# Patient Record
Sex: MALE | Race: ASIAN | NOT HISPANIC OR LATINO
[De-identification: names, ages, dates, MRNs, and addresses within clinical notes are randomized per-mention and may not be internally consistent; named-entity substitution may affect disease eponyms.]

---

## 2022-06-22 PROBLEM — Z00.00 ENCOUNTER FOR PREVENTIVE HEALTH EXAMINATION: Status: ACTIVE | Noted: 2022-06-22

## 2022-06-23 ENCOUNTER — APPOINTMENT (OUTPATIENT)
Dept: OTOLARYNGOLOGY | Facility: CLINIC | Age: 27
End: 2022-06-23
Payer: COMMERCIAL

## 2022-06-23 ENCOUNTER — NON-APPOINTMENT (OUTPATIENT)
Age: 27
End: 2022-06-23

## 2022-06-23 VITALS — WEIGHT: 140 LBS | BODY MASS INDEX: 21.22 KG/M2 | HEIGHT: 68 IN

## 2022-06-23 VITALS — TEMPERATURE: 97.6 F

## 2022-06-23 DIAGNOSIS — H61.92 DISORDER OF LEFT EXTERNAL EAR, UNSPECIFIED: ICD-10-CM

## 2022-06-23 PROCEDURE — 99203 OFFICE O/P NEW LOW 30 MIN: CPT

## 2022-06-24 PROBLEM — H61.92 DISORDER OF LEFT EAR LOBE: Status: ACTIVE | Noted: 2022-06-24

## 2022-06-24 NOTE — ASSESSMENT
[FreeTextEntry1] : Recommend removing the earing.\par Recommend good hygiene.\par Continue abx tx.\par Recommend OTC, Neosporin.\par fu prn.

## 2022-06-24 NOTE — HISTORY OF PRESENT ILLNESS
[de-identified] : Initial visit.\par Chief complaint is "ear lobe abscess/infection"\par \par He reports that 2 weeks ago he developed right ear lobe discomfort.\par He was seen in urgent care 3 days ago and started on Cipro and Cleocin.\par He reports that pus started draining yesterday.

## 2022-06-30 ENCOUNTER — APPOINTMENT (OUTPATIENT)
Dept: OTOLARYNGOLOGY | Facility: CLINIC | Age: 27
End: 2022-06-30

## 2023-01-09 ENCOUNTER — NON-APPOINTMENT (OUTPATIENT)
Age: 28
End: 2023-01-09

## 2023-01-11 ENCOUNTER — APPOINTMENT (OUTPATIENT)
Dept: UROLOGY | Facility: CLINIC | Age: 28
End: 2023-01-11
Payer: COMMERCIAL

## 2023-01-11 VITALS
OXYGEN SATURATION: 99 % | BODY MASS INDEX: 20.46 KG/M2 | SYSTOLIC BLOOD PRESSURE: 104 MMHG | TEMPERATURE: 98.1 F | WEIGHT: 135 LBS | HEIGHT: 68 IN | DIASTOLIC BLOOD PRESSURE: 72 MMHG | HEART RATE: 78 BPM

## 2023-01-11 PROCEDURE — 99204 OFFICE O/P NEW MOD 45 MIN: CPT

## 2023-01-11 RX ORDER — TADALAFIL 10 MG/1
10 TABLET, FILM COATED ORAL
Qty: 10 | Refills: 1 | Status: ACTIVE | COMMUNITY
Start: 2023-01-11 | End: 1900-01-01

## 2023-01-11 NOTE — LETTER BODY
[FreeTextEntry2] : Ruthy Rasheed MD [FreeTextEntry1] : Dear Doctor,\par \par Thank you for your kind referral.  I am enclosing a copy of my office note for your information.\par \par I will keep you informed of any developments.\par \par Feel free to contact me if you have any questions.\par \par Sincerely,\par \par Mert Melissa MD, FACS\par Professor of Urology\par [facility]\par \par [address]\par [place], [place] [address]\par \par [address]\[place], [place] [address]\par \par Office Telephone \par [phone]\par \par Fax\par [phone]\par \par \par

## 2023-01-11 NOTE — ASSESSMENT
[FreeTextEntry1] : Mr Bina is 27-year-old gentleman who was not sexually active until 26 years of age.  He is experienced sexual dysfunction with inability to obtain erection sufficient for intercourse.  He notes that he is masturbating daily and that this increases difficulty with erectile function.  He also notes that he masturbates with pornography which enhanced his ability to get an erection.  He is aware of "performance anxiety".\par \par His examination is unremarkable.\par \par His endocrine studies were normal (outside labs reviewed)\par \par We discussed performance anxiety and various PDE 5 inhibitors.  We discussed the role of tadalafil versus sildenafil.  We also discussed the cessation of daily masturbation and pornography.  We discussed sensate focus approach to sexual function. \par \par  Discussed PD5-I possible side effects, onset of action, duration of action, and food interactions.  Discussed behavioral strategies to optimize efficacy of medication.  \par DIscussed the potential advantages and disadvantages as well as side effect profiles of each.\par Warned re priapism and need for intervention to prevent permanent penile injury,\par \par Plan:\par 1. tadalafil 10 mg\par 2. fu in one month

## 2023-01-11 NOTE — HISTORY OF PRESENT ILLNESS
[FreeTextEntry1] : 27 year old \par began sexual acitivtiy at 26\par complaining of difficulty maintaining erection\par able to maintain full erections with masturbation\par PCP treated with sildenafil  50 mg\par masturbates daily \par uses pornography to masturbate \par for last several months not masturbating daily\par \par \par PMHx:\par FHx:    kidney, bladder, prostate CA\par SurgHx:\par \par Allergies:\par Meds:\par Smoking/drinking/illicit drugs:\par \par PSA:

## 2023-01-11 NOTE — PHYSICAL EXAM
[] : no hepato-splenomegaly [Abdomen Mass (___ Cm)] : no abdominal mass palpated [Abdomen Hernia] : no hernia was discovered [Urethral Meatus] : meatus normal [Penis Abnormality] : normal uncircumcised penis [Epididymis] : the epididymides were normal [Testes Tenderness] : no tenderness of the testes [Testes Mass (___cm)] : there were no testicular masses [Normal Station and Gait] : the gait and station were normal for the patient's age [FreeTextEntry1] : DTR, Babinski, proprioception normal

## 2023-02-08 ENCOUNTER — APPOINTMENT (OUTPATIENT)
Dept: UROLOGY | Facility: CLINIC | Age: 28
End: 2023-02-08

## 2023-02-23 ENCOUNTER — APPOINTMENT (OUTPATIENT)
Dept: UROLOGY | Facility: CLINIC | Age: 28
End: 2023-02-23

## 2023-02-23 NOTE — HISTORY OF PRESENT ILLNESS
[FreeTextEntry1] : 1.11.23\par 27 year old \par began sexual acitivtiy at 26\par complaining of difficulty maintaining erection\par able to maintain full erections with masturbation\par PCP treated with sildenafil 50 mg\par masturbates daily \par uses pornography to masturbate \par for last several months not masturbating daily\par \par \par 2.23.23:\par returns after tadalafil 10 mg

## 2023-02-23 NOTE — ASSESSMENT
[FreeTextEntry1] : Mr Bina is 27-year-old gentleman who was not sexually active until 26 years of age. He is experienced sexual dysfunction with inability to obtain erection sufficient for intercourse. He notes that he is masturbating daily and that this increases difficulty with erectile function. He also notes that he masturbates with pornography which enhanced his ability to get an erection. He is aware of "performance anxiety".\par \par His examination is unremarkable.\par \par His endocrine studies were normal (outside labs reviewed)\par \par We discussed performance anxiety and various PDE 5 inhibitors. We discussed the role of tadalafil versus sildenafil. We also discussed the cessation of daily masturbation and pornography. We discussed sensate focus approach to sexual function. \par \par  Discussed PD5-I possible side effects, onset of action, duration of action, and food interactions. Discussed behavioral strategies to optimize efficacy of medication. \par DIscussed the potential advantages and disadvantages as well as side effect profiles of each.\par Warned re priapism and need for intervention to prevent permanent penile injury,\par \par Plan:\par 1. tadalafil 10 mg\par 2. fu in one month. \par \par 2.23.23\par \par Plan:\par 1.

## 2023-03-01 ENCOUNTER — APPOINTMENT (OUTPATIENT)
Dept: UROLOGY | Facility: CLINIC | Age: 28
End: 2023-03-01
Payer: COMMERCIAL

## 2023-03-01 VITALS
HEART RATE: 70 BPM | SYSTOLIC BLOOD PRESSURE: 117 MMHG | OXYGEN SATURATION: 98 % | DIASTOLIC BLOOD PRESSURE: 66 MMHG | TEMPERATURE: 98.1 F

## 2023-03-01 DIAGNOSIS — F66 OTHER SEXUAL DISORDERS: ICD-10-CM

## 2023-03-01 PROCEDURE — 99214 OFFICE O/P EST MOD 30 MIN: CPT

## 2023-03-01 RX ORDER — NYSTATIN 100000 [USP'U]/G
100000 CREAM TOPICAL TWICE DAILY
Qty: 1 | Refills: 0 | Status: ACTIVE | COMMUNITY
Start: 2023-03-01 | End: 1900-01-01

## 2023-03-01 RX ORDER — TADALAFIL 20 MG/1
20 TABLET ORAL
Qty: 10 | Refills: 3 | Status: ACTIVE | COMMUNITY
Start: 2023-03-01 | End: 1900-01-01

## 2023-03-01 NOTE — PHYSICAL EXAM
[General Appearance - Well Developed] : well developed [General Appearance - Well Nourished] : well nourished [Normal Appearance] : normal appearance [Well Groomed] : well groomed [General Appearance - In No Acute Distress] : no acute distress [Oriented To Time, Place, And Person] : oriented to person, place, and time [Affect] : the affect was normal [Mood] : the mood was normal [Not Anxious] : not anxious [Urethral Meatus] : meatus normal [Penis Abnormality] : normal uncircumcised penis [FreeTextEntry1] : leonardoposthitis

## 2023-03-01 NOTE — ASSESSMENT
[FreeTextEntry1] : Mr Bina is 27-year-old gentleman who was not sexually active until 26 years of age.  He is experienced sexual dysfunction with inability to obtain erection sufficient for intercourse.  He notes that he is masturbating daily and that this increases difficulty with erectile function.  He also notes that he masturbates with pornography which enhanced his ability to get an erection.  He is aware of "performance anxiety".\par \par His examination is unremarkable.\par \par His endocrine studies were normal (outside labs reviewed)\par \par We discussed performance anxiety and various PDE 5 inhibitors.  We discussed the role of tadalafil versus sildenafil.  We also discussed the cessation of daily masturbation and pornography.  We discussed sensate focus approach to sexual function. \par \par Discussed PD5-I possible side effects, onset of action, duration of action, and food interactions.  Discussed behavioral strategies to optimize efficacy of medication.  \par DIscussed the potential advantages and disadvantages as well as side effect profiles of each.\par Warned re priapism and need for intervention to prevent permanent penile injury,\par \par discussed candida balanoposthitis\par discussed hygiene\par nysatitn\par \par Plan:\par 1. tadalafil 10 mg\par 2. fu in one month\par \par 3.1.2023\par tadalafil 10 mg\par improved erection able to have sexual intercourse\par added sildenafil in addition to tadalafil able to obtain erection when on back\par dsicussed potential risk of adding sildenafil to tadalafil\par patient notes "anxiety about performance"\par stopped pornography concerned that he will never be turned on in sexual activity\par discussed "observation" of sexuality\par discussed stopping finasteride in light of sexual dysfunction and concern\par patient concerned re libido; discussed T levels and daily masturbation\par Plan\par 1. tadalail 20 mg.  Warned re priapism risk and need for immediate intervention if erection lasts more than 2 hours.  Patient instructed to report to an emergency room and explicitly inform staff of his condition and need for treatment.\par 2. "pornography" addiction specialist\par \par

## 2023-03-01 NOTE — HISTORY OF PRESENT ILLNESS
[FreeTextEntry1] : 27 year old \par began sexual activity at 26\par complaining of difficulty maintaining erection\par able to maintain full erections with masturbation\par PCP treated with sildenafil  50 mg\par masturbates daily \par uses pornography to masturbate \par for last several months not masturbating daily\par \par 3.1.2023\par returns re erectile dysfunction\par "tadalafil" is good\par took sildenafil and tadalafil\par \par complaining of penile irritation

## 2023-03-24 ENCOUNTER — APPOINTMENT (OUTPATIENT)
Dept: ENDOCRINOLOGY | Facility: CLINIC | Age: 28
End: 2023-03-24
Payer: COMMERCIAL

## 2023-03-24 VITALS
DIASTOLIC BLOOD PRESSURE: 74 MMHG | BODY MASS INDEX: 21.29 KG/M2 | HEART RATE: 74 BPM | WEIGHT: 140 LBS | SYSTOLIC BLOOD PRESSURE: 119 MMHG

## 2023-03-24 DIAGNOSIS — Z11.3 ENCOUNTER FOR SCREENING FOR INFECTIONS WITH A PREDOMINANTLY SEXUAL MODE OF TRANSMISSION: ICD-10-CM

## 2023-03-24 DIAGNOSIS — E55.9 VITAMIN D DEFICIENCY, UNSPECIFIED: ICD-10-CM

## 2023-03-24 DIAGNOSIS — L65.9 NONSCARRING HAIR LOSS, UNSPECIFIED: ICD-10-CM

## 2023-03-24 PROCEDURE — 99203 OFFICE O/P NEW LOW 30 MIN: CPT

## 2023-03-24 NOTE — PHYSICAL EXAM
[Alert] : alert [No Acute Distress] : no acute distress [Normal Voice/Communication] : normal voice communication [Normal Hearing] : hearing was normal [No Respiratory Distress] : no respiratory distress [Normal Rate and Effort] : normal respiratory rate and effort [Clear to Auscultation] : lungs were clear to auscultation bilaterally [Normal S1, S2] : normal S1 and S2 [Normal Rate] : heart rate was normal [Regular Rhythm] : with a regular rhythm [Normal Gait] : normal gait [Oriented x3] : oriented to person, place, and time [Normal Affect] : the affect was normal [Normal Insight/Judgement] : insight and judgment were intact [Normal Mood] : the mood was normal

## 2023-03-29 LAB
25(OH)D3 SERPL-MCNC: 25.5 NG/ML
ALBUMIN SERPL ELPH-MCNC: 4.5 G/DL
ALP BLD-CCNC: 45 U/L
ALT SERPL-CCNC: 24 U/L
ANION GAP SERPL CALC-SCNC: 10 MMOL/L
AST SERPL-CCNC: 34 U/L
BILIRUB SERPL-MCNC: 0.7 MG/DL
BUN SERPL-MCNC: 17 MG/DL
CALCIUM SERPL-MCNC: 9.9 MG/DL
CHLORIDE SERPL-SCNC: 102 MMOL/L
CO2 SERPL-SCNC: 28 MMOL/L
CREAT SERPL-MCNC: 1.05 MG/DL
EGFR: 100 ML/MIN/1.73M2
FSH SERPL-MCNC: 3.4 IU/L
GLUCOSE SERPL-MCNC: 92 MG/DL
HIV1+2 AB SPEC QL IA.RAPID: NONREACTIVE
LH SERPL-ACNC: 5.4 IU/L
POTASSIUM SERPL-SCNC: 4.1 MMOL/L
PROT SERPL-MCNC: 6.7 G/DL
SHBG SERPL-SCNC: 33.5 NMOL/L
SODIUM SERPL-SCNC: 140 MMOL/L

## 2023-04-05 PROBLEM — Z11.3 SCREENING FOR STD (SEXUALLY TRANSMITTED DISEASE): Status: ACTIVE | Noted: 2023-03-24

## 2023-04-05 LAB
C TRACH RRNA SPEC QL NAA+PROBE: NOT DETECTED
DHEA-SULFATE, SERUM: 302 UG/DL
N GONORRHOEA RRNA SPEC QL NAA+PROBE: NOT DETECTED
RPR SER-TITR: NORMAL
SOURCE AMPLIFICATION: NORMAL
TESTOST FREE SERPL-MCNC: 39.2 PG/ML
TESTOST SERPL-MCNC: 1061 NG/DL

## 2023-04-05 NOTE — ADDENDUM
[FreeTextEntry1] : 3/29/23, addendum, SG\par I will call when all labs finalized to discuss results\par \par 4/5/2023, addendum, GABRIELLE\par Called and reviewed labs with patient\par Total and free Testo elevated with normal DHEA-S, LH/FSH and SHBG\par -- recommend d/c finasteride and repeating testosterone in 4-6 weeks, if still elevated to proceed with adrenal/testicular US to r/o mass.  PAtient prefers to proceed with imaging at this time rather than waiting, RX for abdominal US and testicular US ordered and emailed to patient for scheduling\par STI panel NEG\par vit D25.5, reviewed increased consistency with supplementation

## 2023-04-05 NOTE — ASSESSMENT
[FreeTextEntry1] : 1-2. 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  \par -- repeat labs\par \par 3. vitD deficiency\par History of\par No current supplement consistently\par -- repeat with labs\par \par 4. Desires STI screening with labs, included\par \par Alycia Cameron, MS, FNP-BC, CDCES\par 03/24/2023\par \par

## 2023-04-05 NOTE — HISTORY OF PRESENT ILLNESS
[FreeTextEntry1] : Mr. MANUEL is a 27 year male with pmhx of erectile dysfunction who presents for hair loss and elevated testosterone  evaluation.\par \par For past 1-1.5y endorses hair loss, decreasing densiity to hair on head\par Has seen other providers (PCP, derm)\par Feels that hair loss is rapid, but believed to be male pattern baldness\par Baldness in family does not set in for a while\par Treating with finesteride (1mg) 1.5 months ago and minoxodil topical with derm\par \par Follows urologist, Dr. Mert Melissa for ED\par On tadalafil for ED management\par \par Labs from 12/28/2022 reviewed with patien today that reveal total testosterone WNL, elevated free testosterone with normal SHBG, FSH, LH DHEA-S and TSH.\par \par Consumes high protein diet\par Protein shake \par No supplements\par \par

## 2023-06-01 ENCOUNTER — APPOINTMENT (OUTPATIENT)
Dept: UROLOGY | Facility: CLINIC | Age: 28
End: 2023-06-01
Payer: COMMERCIAL

## 2023-06-01 VITALS
BODY MASS INDEX: 20.61 KG/M2 | WEIGHT: 136 LBS | HEART RATE: 92 BPM | OXYGEN SATURATION: 98 % | TEMPERATURE: 98 F | HEIGHT: 68 IN | DIASTOLIC BLOOD PRESSURE: 72 MMHG | SYSTOLIC BLOOD PRESSURE: 116 MMHG

## 2023-06-01 DIAGNOSIS — R79.89 OTHER SPECIFIED ABNORMAL FINDINGS OF BLOOD CHEMISTRY: ICD-10-CM

## 2023-06-01 DIAGNOSIS — N52.9 MALE ERECTILE DYSFUNCTION, UNSPECIFIED: ICD-10-CM

## 2023-06-01 DIAGNOSIS — B37.49 OTHER UROGENITAL CANDIDIASIS: ICD-10-CM

## 2023-06-01 PROCEDURE — 99214 OFFICE O/P EST MOD 30 MIN: CPT

## 2023-06-01 RX ORDER — TADALAFIL 5 MG/1
5 TABLET ORAL
Qty: 30 | Refills: 3 | Status: ACTIVE | COMMUNITY
Start: 2023-06-01 | End: 1900-01-01

## 2023-06-01 NOTE — ASSESSMENT
[FreeTextEntry1] : Mr Bina is 27-year-old gentleman who was not sexually active until 26 years of age.  He is experienced sexual dysfunction with inability to obtain erection sufficient for intercourse.  He notes that he is masturbating daily and that this increases difficulty with erectile function.  He also notes that he masturbates with pornography which enhanced his ability to get an erection.  He is aware of "performance anxiety".\par \par His examination is unremarkable.\par \par His endocrine studies were normal (outside labs reviewed)\par \par We discussed performance anxiety and various PDE 5 inhibitors.  We discussed the role of tadalafil versus sildenafil.  We also discussed the cessation of daily masturbation and pornography.  We discussed sensate focus approach to sexual function. \par \par Discussed PD5-I possible side effects, onset of action, duration of action, and food interactions.  Discussed behavioral strategies to optimize efficacy of medication.  \par DIscussed the potential advantages and disadvantages as well as side effect profiles of each.\par Warned re priapism and need for intervention to prevent permanent penile injury,\par \par discussed candida balanoposthitis\par discussed hygiene\par nysatitn\par \par Plan:\par 1. tadalafil 10 mg\par 2. fu in one month\par \par 3.1.2023\par tadalafil 10 mg\par improved erection able to have sexual intercourse\par added sildenafil in addition to tadalafil able to obtain erection when on back\par dsicussed potential risk of adding sildenafil to tadalafil\par patient notes "anxiety about performance"\par stopped pornography concerned that he will never be turned on in sexual activity\par discussed "observation" of sexuality\par discussed stopping finasteride in light of sexual dysfunction and concern\par patient concerned re libido; discussed T levels and daily masturbation\par Plan\par 1. tadalail 20 mg.  Warned re priapism risk and need for immediate intervention if erection lasts more than 2 hours.  Patient instructed to report to an emergency room and explicitly inform staff of his condition and need for treatment.\par 2. "pornography" addiction specialist\par \par 6.1.2023\par returns after endocrinology evaluation\par recommended ultrasound abdomen\par dheas levels reviewed normal  (up to date) \par DHEAS level reported 302 \par ?concern re elevated levels recommend ultrasound evaluation\par patient understands need to followup\par reviewed endocrine consultation\par \par Patient had no stopped finasteride\par because of concern re hair loss\par \par returns on tadalafil 10 mg\par good response\par no side effects\par patient interested in daily in tadalafil\par \par balanoposthitis resolved after nystatin\par reviewed hygiene measures\par \par Plan:\par 1. T/ DHEAS\par 2.  tadalafil 5 mg\par \par

## 2023-06-01 NOTE — PHYSICAL EXAM
[Urethral Meatus] : meatus normal [Penis Abnormality] : normal uncircumcised penis [FreeTextEntry1] : balanoposthitis resolved

## 2023-06-02 LAB — SHBG SERPL-SCNC: 33.6 NMOL/L

## 2023-06-04 LAB
TESTOST FREE SERPL-MCNC: 19.8 PG/ML
TESTOST SERPL-MCNC: 716 NG/DL

## 2023-06-09 LAB — DHEA-SULFATE, SERUM: 334 UG/DL
